# Patient Record
Sex: FEMALE | URBAN - METROPOLITAN AREA
[De-identification: names, ages, dates, MRNs, and addresses within clinical notes are randomized per-mention and may not be internally consistent; named-entity substitution may affect disease eponyms.]

---

## 2017-05-11 ENCOUNTER — OFFICE VISIT (OUTPATIENT)
Dept: FAMILY MEDICINE CLINIC | Age: 32
End: 2017-05-11

## 2017-05-11 VITALS
RESPIRATION RATE: 16 BRPM | DIASTOLIC BLOOD PRESSURE: 71 MMHG | HEART RATE: 80 BPM | HEIGHT: 64 IN | OXYGEN SATURATION: 98 % | WEIGHT: 214 LBS | TEMPERATURE: 98.1 F | SYSTOLIC BLOOD PRESSURE: 105 MMHG | BODY MASS INDEX: 36.54 KG/M2

## 2017-05-11 DIAGNOSIS — Z02.89 HISTORY AND PHYSICAL EXAMINATION, IMMIGRATION: Primary | ICD-10-CM

## 2017-05-14 PROBLEM — Z02.89 HISTORY AND PHYSICAL EXAMINATION, IMMIGRATION: Status: ACTIVE | Noted: 2017-05-14

## 2017-05-14 NOTE — PROGRESS NOTES
Alma Urban is a 27 y.o.  female and presents with initial immigration physical.     Chief Complaint   Patient presents with    Physical     Subjective: Additional Concerns: none    No Known Allergies  History reviewed. No pertinent past medical history. Past Surgical History:   Procedure Laterality Date    HX APPENDECTOMY       History reviewed. No pertinent family history.   Social History   Substance Use Topics    Smoking status: Current Every Day Smoker    Smokeless tobacco: Never Used    Alcohol use No     ROS     General: negative for - chills, fatigue, fever, weight change  Psych: negative for - anxiety, depression, irritability or mood swings  Resp: negative for - cough, shortness of breath or wheezing  CV: negative for - chest pain, edema or palpitations  GI: negative for - abdominal pain, change in bowel habits, constipation, diarrhea or nausea/vomiting  : negative for - dysuria, hematuria, incontinence, pelvic pain or vulvar/vaginal symptoms  MSK: negative for - joint pain, joint swelling or muscle pain  Neuro: negative for - confusion, headaches, seizures or weakness    Objective:  Vitals:    05/11/17 1520   BP: 105/71   Pulse: 80   Resp: 16   Temp: 98.1 °F (36.7 °C)   TempSrc: Oral   SpO2: 98%   Weight: 214 lb (97.1 kg)   Height: 5' 4\" (1.626 m)   PainSc:   0 - No pain   LMP: 05/10/2017     PE    Alert, well appearing, and in no distress, oriented to person, place, and time and overweight  Mental status - alert, oriented to person, place, and time, normal mood, behavior, speech, dress, motor activity, and thought processes  Chest - clear to auscultation, no wheezes, rales or rhonchi, symmetric air entry  Heart - normal rate, regular rhythm, normal S1, S2, no murmurs, rubs, clicks or gallops  Extremities - peripheral pulses normal, no pedal edema, no clubbing or cyanosis    LABS   TB quant, RPR, gonorrhea screening    TESTS  No results found for this or any previous visit. Assessment/Plan:      Immigration physical - No Class A or B conditions. Patient will give us documentation for Tdap. MMR and varicella. Lab review: orders written for new lab studies as appropriate; see orders. I have discussed the diagnosis with the patient and the intended plan as seen in the above orders. The patient has received an after-visit summary and questions were answered concerning future plans. I have discussed medication side effects and warnings with the patient as well. I have reviewed the plan of care with the patient, accepted their input and they are in agreement with the treatment goals.      F/U as needed    Rahul Collins MD

## 2017-05-14 NOTE — PATIENT INSTRUCTIONS
